# Patient Record
Sex: MALE | Race: WHITE | NOT HISPANIC OR LATINO | Employment: UNEMPLOYED | URBAN - METROPOLITAN AREA
[De-identification: names, ages, dates, MRNs, and addresses within clinical notes are randomized per-mention and may not be internally consistent; named-entity substitution may affect disease eponyms.]

---

## 2020-06-11 ENCOUNTER — COMMUNICATION - HEALTHEAST (OUTPATIENT)
Dept: EMERGENCY MEDICINE | Facility: CLINIC | Age: 50
End: 2020-06-11

## 2021-06-16 PROBLEM — F31.9 BIPOLAR DISORDER (H): Status: ACTIVE | Noted: 2020-06-10

## 2021-06-20 NOTE — LETTER
Letter by Sharon Davis RN at      Author: Sharon Davis RN Service: -- Author Type: --    Filed:  Encounter Date: 6/11/2020 Status: (Other)       6/11/2020        Aston Soler  [address] [place] [address]    This letter provides a written record that you were tested for COVID-19 on 6/10/20.     Your result was negative.    This means that we didnt find the virus that causes COVID-19 in your sample. A test may show negative when you do actually have the virus. This can happen when the virus is in the early stages of infection, before you feel illness symptoms.    Even if you dont have symptoms, they may still appear. For safety, its very important to follow these rules.    Keep yourself away from others (self-isolation):      Stay home. Dont go to work, school or anywhere else.     Stay in your own room (and use your own bathroom), if you can.    Stay away from others in your home. No hugging, kissing or shaking hands. No visitors.    Clean high touch surfaces often (doorknobs, counters, handles, etc.). Use a household cleaning spray or wipes.    Cover your mouth and nose with a mask, tissue or washcloth to avoid spreading germs.    Wash your hands and face often with soap and water.    Stay in self-isolation until you meet ALL of the guidelines below:    1. You have had no fever for at least 72 hours (that is 3 full days of no fever without the use of medicine that reduces fevers), AND  2. other symptoms (such as cough, shortness of breath) have gotten better, AND  3. at least 10 days have passed since your symptoms first appeared.    Going back to work  Check with your employer for any guidelines to follow for going back to work.    Employers: This document serves as formal notice that your employee tested negative for COVID-19, as of the testing date shown above.    For questions regarding this letter or your Negative COVID-19 result, call [phone] between 8A to 6:30P (M-F) and  10A to 6:30P (weekends).

## 2022-04-19 ENCOUNTER — HOSPITAL ENCOUNTER (EMERGENCY)
Facility: CLINIC | Age: 52
Discharge: HOME | End: 2022-04-19
Attending: EMERGENCY MEDICINE | Admitting: EMERGENCY MEDICINE

## 2022-04-19 VITALS
BODY MASS INDEX: 21.82 KG/M2 | SYSTOLIC BLOOD PRESSURE: 155 MMHG | WEIGHT: 170 LBS | DIASTOLIC BLOOD PRESSURE: 94 MMHG | HEART RATE: 78 BPM | RESPIRATION RATE: 18 BRPM | TEMPERATURE: 97.8 F | HEIGHT: 74 IN | OXYGEN SATURATION: 98 %

## 2022-04-19 DIAGNOSIS — H10.9 CONJUNCTIVITIS OF BOTH EYES, UNSPECIFIED CONJUNCTIVITIS TYPE: ICD-10-CM

## 2022-04-19 PROCEDURE — 250N000013 HC RX MED GY IP 250 OP 250 PS 637: Performed by: EMERGENCY MEDICINE

## 2022-04-19 PROCEDURE — 99284 EMERGENCY DEPT VISIT MOD MDM: CPT | Performed by: EMERGENCY MEDICINE

## 2022-04-19 PROCEDURE — 99283 EMERGENCY DEPT VISIT LOW MDM: CPT | Performed by: EMERGENCY MEDICINE

## 2022-04-19 RX ORDER — PROPARACAINE HYDROCHLORIDE 5 MG/ML
2 SOLUTION/ DROPS OPHTHALMIC ONCE
Status: DISCONTINUED | OUTPATIENT
Start: 2022-04-19 | End: 2022-04-19 | Stop reason: HOSPADM

## 2022-04-19 RX ORDER — OXYCODONE HYDROCHLORIDE 5 MG/1
5 TABLET ORAL ONCE
Status: COMPLETED | OUTPATIENT
Start: 2022-04-19 | End: 2022-04-19

## 2022-04-19 RX ORDER — ERYTHROMYCIN 5 MG/G
0.5 OINTMENT OPHTHALMIC AT BEDTIME
Qty: 1 G | Refills: 0 | Status: SHIPPED | OUTPATIENT
Start: 2022-04-19

## 2022-04-19 RX ADMIN — OXYCODONE HYDROCHLORIDE 5 MG: 5 TABLET ORAL at 05:58

## 2022-04-19 NOTE — ED TRIAGE NOTES
Pt presents to ED via EMS c/o bilateral eye drainage that has worsened over the last week. Pt diagnosed earlier this week with conjunctivitis and prescribed antimitotics drops with no relief.

## 2022-04-19 NOTE — DISCHARGE INSTRUCTIONS
Use the prescription eye ointment at night. It will make your vision blurry (temporarily) but will help with comfort. During the day you can use over the counter preservative free artificial tears - which you can purchases without a prescription. You can also use cool compresses for comfort. Return with any worsening or concerns.    Please make an appointment to follow up with Eye Clinic (phone: [phone]) in one week.

## 2022-04-19 NOTE — CONSULTS
OPHTHALMOLOGY CONSULT NOTE  04/19/22    Patient: Aston Soler  Consulted by: ED  Reason for Consult: Bilateral eye redness and pain with pain worse right>left    ASSESSMENT/PLAN:     Aston Soler is a 51 year old male who presents with     1. Acute conjunctivitis, likely viral, bilateral  - Sx onset approx 04/12/2022  - Suspect viral given onset of diarrhea during this time period   - Exam significant only for symmetric superficial, blanching with phenylephrine superficial injection otherwise no signs of corneal infection, trauma to the cornea, anterior chamber inflammation and IOP are normal at 18 bilaterally. The corneas currently do not have punctate keratitis arguing against a severe epidemic keratoconjunctivitis at this time. There are no subepithelial infiltrates either.   - Pupils are miotic and do not dilate well (approx 4mm); suspect pharmaceutically by opioids (I examined him only 5-10 minutes after he was given 5mg oxycodone), but patient denies illicit drug abuse. Does smoke marijuana but last time was 2 weeks prior.  - Posterior segment examination benign though could not view more of the periphery due to poor dilation, but doubt there are significant findings that relates to the current presentation  - Patient has no significant risk factors other than being undomeciled; no contact lens, no known or evident occular trauma, prior surgeries/procedures. No h/o HIV, no DM.   Recommendations  - Artificial tears QID and erythromycin ointment at bedtime OU  - Cool compresses as often as his situation would allow  - Recommend follow-up in 1 week at [facility], will schedule for 04/26/2022 at 10am (patient agreed) to rule out complications and also for general routine occular health.     My privilege to be part of your care,  Napoleon Manning MD, MSc  Ophthalmology PGY-2 resident physician  Pager: [phone]    HISTORY OF PRESENTING ILLNESS:     Aston Soler is a 51 year old male with bipolar  "disorder and self-reported unremarkable POHx developed bilateral eye redness and throbbing pain, worse right than left 1 week prior, or possibly 2 weeks, he was not consistent. He does not think his vision is decreased initially when asked. He went to a free clinic at a shelter and they gave him an eye drop and one pill a day for the last 7 days he cannot remember what they were. The pill was football shaped, pink/white and he was told by the shelter provider it would \"take everything away\". Around the time of onset of the ocular symptoms, he also had loose stools but denies cough, congestion, dyspnea, or sick contacts he remembers but he is unsure. He is unsure if he got anything in the eye. He denies specific trauma to the eye. He denies new joint pains or rashes, though he he does have long standing neuropathy.     Review of systems were otherwise negative except for that which has been stated above.      OCULAR/MEDICAL/SURGICAL HISTORIES:     Past Ocular History:  Last eye exam: Does not remember  Prior eye surgery/laser: None  Contact lens wear: None  Glasses: Should wear but does not have any  Eyedrops: None chronically; did receive \"unknown drop\" for 1 week prior to ED visit    Family History:  - Negative glaucoma, AMD    Social History:  - Transient housing situation  - Lives [place] [facility] [place]   History reviewed. No pertinent past medical history.    History reviewed. No pertinent surgical history.    EXAMINATION:     Base Eye Exam     Visual Acuity       Right Left    Near sc 20/70 20/100          Tonometry (Tonopen, 6:23 AM)       Right Left    Pressure 18 18          Pupils       Dark Light Shape React APD    Right 1.5 1 Round 1/4 no obvious APD    Left 1.5 1 Round 1/4 no obvious APD          Extraocular Movement       Right Left     Full Full          Dilation     Both eyes: 1.0% Mydriacyl, 2.5% Dmitri Synephrine @ 6:33 AM            Slit Lamp and Fundus Exam     External Exam       Right " Left    External Normal Normal          Slit Lamp Exam       Right Left    Lids/Lashes Normal, no significant blepharitis, no herpetic lesions Normal, no significant blepharitis, no herpetic lesions    Conjunctiva/Sclera 2+ superficial diffuse injection, tr dependent chemosis 2+ superficial diffuse injection, tr dependent chemosis    Cornea scattered PEEs,  faint scar, no epi defect, no SEIs, overall clear scattered PEEs, no epi defect, no SEIs, overall clear    Anterior Chamber Deep and quiet, no cell/flare Deep and quiet, no cell/flare    Iris Miotic, minimally reactive --> dilated Miotic, minimally reactive --> dilated    Lens NS,cortical NS,cortical

## 2022-04-19 NOTE — ED PROVIDER NOTES
ED Provider Note  [facility]      History     Chief Complaint   Patient presents with     Eye Drainage     HPI  Aston Soler is a 51 year old male presents to the ED with bilateral eye pain for about a week.  He states that he was seen at the clinic at the [facility] about a week ago by a doctor, started on both an eyedrop as well as an oral medication.  He does not know what either of those were.  He states that the oral medication is nearly done, he just finished the last of the eyedrops a couple hours ago.  He states that his eyes feel worse instead of better after taking these medications.  He states that he is to the point where he cannot really open his eyes.  He has frequent tearing and drainage.  He has a little bit of a stuffy nose over this timeframe as well, perhaps a mild cough.  No fever.  He states that both eyes are bothering him but the right eye just seems to be throbbing and painful inside the eye.  He states is hard to tell if his vision is blurred because he cannot really open his eyes, but thinks it is.  No injuries to the eyes.  He does not wear glasses or contact lenses.  No history of eye issues such as glaucoma.    Past Medical History  History reviewed. No pertinent past medical history.  History reviewed. No pertinent surgical history.  erythromycin (ROMYCIN) 5 MG/GM ophthalmic ointment      No Known Allergies  Family History  History reviewed. No pertinent family history.  Social History   Social History     Tobacco Use     Smoking status: Current Every Day Smoker     Packs/day: 1.00     Types: Cigarettes     Smokeless tobacco: Never Used   Substance Use Topics     Alcohol use: Not Currently     Drug use: Yes     Types: Marijuana      Past medical history, past surgical history, medications, allergies, family history, and social history were reviewed with the patient. No additional pertinent items.       Review of Systems  A complete review of systems was  "performed with pertinent positives and negatives noted in the HPI, and all other systems negative.    Physical Exam   BP: (!) 155/94  Pulse: 78  Temp: 97.8  F (36.6  C)  Resp: 18  Height: 188 cm (6' 2\")  Weight: 77.1 kg (170 lb)  SpO2: 98 %  Physical Exam  Constitutional:       General: He is not in acute distress.     Appearance: He is not diaphoretic.   HENT:      Head: Atraumatic.   Eyes:      General: No scleral icterus.     Comments: Severe bilateral conjunctival injection. Pupils small bilaterally. Exam very limited as pt extremely resistant to opening his eyes   Cardiovascular:      Heart sounds: Normal heart sounds.   Pulmonary:      Effort: No respiratory distress.      Breath sounds: Normal breath sounds.   Abdominal:      Palpations: Abdomen is soft.      Tenderness: There is no abdominal tenderness.   Musculoskeletal:         General: No tenderness.   Skin:     General: Skin is warm.      Findings: No rash.         ED Course      Procedures                     No results found for any visits on 04/19/22.  Medications   proparacaine (ALCAINE) 0.5 % ophthalmic solution 2 drop (has no administration in time range)   oxyCODONE (ROXICODONE) tablet 5 mg (5 mg Oral Given 4/19/22 0558)        Assessments & Plan (with Medical Decision Making)   The patient had difficulty cooperating with exam due to discomfort.  I did instill proparacaine drops into each eye, he reported that the left eye had improvement with that but the right eye was still quite painful, reported that it was throbbing inside the eye itself.  I did attempt to obtain intraocular pressure evaluation via Des-Pen.  In the left eye I obtained low numbers such as 9 and 16.  In the right eye, I had high numbers such as 44 and 68.  I was unable to get further readings.  It was extremely difficult to get an exam is he was very uncomfortable, unwilling to keep his eye open, very photophobic.  Given this I did asked ophthalmology to see the patient.  I " did give him 1 oxycodone to help with the exam.  The ophthalmologist was able to get better readings on his pressure, obtain normal readings, does not feel this is glaucoma.  He feels the patient has conjunctivitis which is most likely viral.  He recommends erythromycin ointment at night, artificial tears during the day as well as cool compresses and follow-up with him in 1 week.  He is encouraged to return with new or worsening symptoms, any other concerns.    Dictation Disclaimer: Some of this Note has been completed with voice-recognition dictation software. Although errors are generally corrected real-time, there is the potential for a rare error to be present in the completed chart.      I have reviewed the nursing notes. I have reviewed the findings, diagnosis, plan and need for follow up with the patient.    New Prescriptions    ERYTHROMYCIN (ROMYCIN) 5 MG/GM OPHTHALMIC OINTMENT    Place 0.5 inches into both eyes At Bedtime       Final diagnoses:   Conjunctivitis of both eyes, unspecified conjunctivitis type       --  Rosa Perla  [facility] EMERGENCY DEPARTMENT  4/19/2022     Rosa Perla MD  04/19/22 9051